# Patient Record
Sex: MALE | Race: WHITE | NOT HISPANIC OR LATINO | Employment: FULL TIME | URBAN - METROPOLITAN AREA
[De-identification: names, ages, dates, MRNs, and addresses within clinical notes are randomized per-mention and may not be internally consistent; named-entity substitution may affect disease eponyms.]

---
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
## 2021-04-17 ENCOUNTER — APPOINTMENT (OUTPATIENT)
Dept: RADIOLOGY | Facility: IMAGING CENTER | Age: 21
End: 2021-04-17
Attending: NURSE PRACTITIONER
Payer: COMMERCIAL

## 2021-04-17 ENCOUNTER — OFFICE VISIT (OUTPATIENT)
Dept: URGENT CARE | Facility: PHYSICIAN GROUP | Age: 21
End: 2021-04-17
Payer: COMMERCIAL

## 2021-04-17 VITALS
BODY MASS INDEX: 34.07 KG/M2 | DIASTOLIC BLOOD PRESSURE: 78 MMHG | OXYGEN SATURATION: 98 % | HEART RATE: 77 BPM | RESPIRATION RATE: 18 BRPM | SYSTOLIC BLOOD PRESSURE: 118 MMHG | HEIGHT: 69 IN | TEMPERATURE: 97.7 F | WEIGHT: 230 LBS

## 2021-04-17 DIAGNOSIS — S99.911A RIGHT ANKLE INJURY, INITIAL ENCOUNTER: ICD-10-CM

## 2021-04-17 DIAGNOSIS — S99.191A CLOSED FRACTURE OF BASE OF FIFTH METATARSAL BONE OF RIGHT FOOT AT METAPHYSEAL-DIAPHYSEAL JUNCTION, INITIAL ENCOUNTER: ICD-10-CM

## 2021-04-17 PROCEDURE — 73610 X-RAY EXAM OF ANKLE: CPT | Mod: TC,RT | Performed by: NURSE PRACTITIONER

## 2021-04-17 PROCEDURE — 99214 OFFICE O/P EST MOD 30 MIN: CPT | Mod: 25 | Performed by: NURSE PRACTITIONER

## 2021-04-17 PROCEDURE — 29515 APPLICATION SHORT LEG SPLINT: CPT | Performed by: NURSE PRACTITIONER

## 2021-04-17 ASSESSMENT — ENCOUNTER SYMPTOMS
BACK PAIN: 0
SENSORY CHANGE: 0
FOCAL WEAKNESS: 0
CHILLS: 0
DIZZINESS: 0
WEAKNESS: 0
NECK PAIN: 0
HEADACHES: 0
FEVER: 0

## 2021-04-17 NOTE — PROGRESS NOTES
"Subjective:   Hudson Vila is a 20 y.o. male who presents for Ankle Injury (right ankle, lateral side. swollen. landed on foot worng )      HPI  20-year-old male patient in urgent care for right ankle injury that occurred yesterday.  Patient states that he was participating in [employer] and stepped wrong causing his right ankle to roll to the outside.  Patient states he felt a pop and has been unable to place any weight on it since.  He has not taken anything over-the-counter for symptoms.  Patient states he has some slight tingling in the foot as well.  Has no problems with range of motion but it does produce pain when he moves it.    Review of Systems   Constitutional: Negative for chills, fever and malaise/fatigue.   Musculoskeletal: Positive for joint pain. Negative for back pain and neck pain.   Neurological: Negative for dizziness, sensory change, focal weakness, weakness and headaches.   All other systems reviewed and are negative.      Patient Active Problem List   Diagnosis   • No active medical problems     History reviewed. No pertinent surgical history.  Social History     Tobacco Use   • Smoking status: Never Smoker   • Smokeless tobacco: Never Used   Substance Use Topics   • Alcohol use: No   • Drug use: No      History reviewed. No pertinent family history.   (Allergies, Medications, & Tobacco/Substance Use were reconciled by the Medical Assistant and reviewed by myself. The family history is prepopulated)     Objective:     /78   Pulse 77   Temp 36.5 °C (97.7 °F) (Temporal)   Resp 18   Ht 1.753 m (5' 9\")   Wt 104 kg (230 lb)   SpO2 98%   BMI 33.97 kg/m²     Physical Exam  Vitals reviewed.   Constitutional:       Appearance: Normal appearance.   Cardiovascular:      Rate and Rhythm: Normal rate and regular rhythm.      Heart sounds: Normal heart sounds.   Pulmonary:      Effort: Pulmonary effort is normal.      Breath sounds: Normal breath sounds.   Musculoskeletal:      " Right ankle: Swelling present. No deformity, ecchymosis or lacerations. Tenderness present over the lateral malleolus. No medial malleolus, ATF ligament, AITF ligament, CF ligament, posterior TF ligament, base of 5th metatarsal or proximal fibula tenderness. Decreased range of motion. Normal pulse.      Comments: STS, TTP at right lateral malleolus    Skin:     Capillary Refill: Capillary refill takes less than 2 seconds.   Neurological:      Mental Status: He is alert and oriented to person, place, and time.   Psychiatric:         Mood and Affect: Mood normal.         Behavior: Behavior normal.         Thought Content: Thought content normal.         Judgment: Judgment normal.         Assessment/Plan:     1. Right ankle injury, initial encounter  DX-ANKLE 3+ VIEWS RIGHT   2. Closed fracture of base of fifth metatarsal bone of right foot at metaphyseal-diaphyseal junction, initial encounter  REFERRAL TO SPORTS MEDICINE        FINDINGS:  Bone mineralization is normal.  Acute transverse fracture through the proximal end of the diaphysis of the fifth metatarsal bone is identified. No other fractures are identified.  There is no soft tissue swelling seen. The ankle mortise is   well-maintained.        IMPRESSION:     Haas fracture through the proximal fifth metatarsal bone is identified.  All images read and interpreted by radiology - discussed with patient     Discussed physical examination findings as well as patient presentation, mechanism of injury and x-ray findings are consistent with a Haas fracture through the right proximal fifth metatarsal.  Posterior splint applied and referral to sports medicine provided as well as crutches.  Patient should remain nonweightbearing until he is cleared by specialist.  Discussed supportive care measures including rest, ice, elevation and over-the-counter NSAIDs and Tylenol per 's instructions.    Orthoglass splint applied by provider.  Patient is neurovascularly  intact after application of splint     Work note provided    Differential diagnosis, natural history, supportive care, and indications for immediate follow-up discussed.    Advised the patient to follow-up with the primary care physician for recheck, reevaluation, and consideration of further management.    Please note that this dictation was created using voice recognition software. I have made a reasonable attempt to correct obvious errors, but I expect that there are errors of grammar and possibly content that I did not discover before finalizing the note.    This note was electronically signed ÁNGELA Lam

## 2021-04-17 NOTE — LETTER
April 17, 2021         Patient: Hudson Vila   YOB: 2000   Date of Visit: 4/17/2021           To Whom it May Concern:    Hudson Vila was seen in my clinic on 4/17/2021. He is to be non weight bearing to right lower extremity until cleared by specialist     If you have any questions or concerns, please don't hesitate to call.        Sincerely,           Heather Hernandez A.P.R.N.  Electronically Signed

## 2021-04-21 ENCOUNTER — APPOINTMENT (OUTPATIENT)
Dept: RADIOLOGY | Facility: IMAGING CENTER | Age: 21
End: 2021-04-21
Attending: FAMILY MEDICINE
Payer: COMMERCIAL

## 2021-04-21 ENCOUNTER — OFFICE VISIT (OUTPATIENT)
Dept: MEDICAL GROUP | Facility: CLINIC | Age: 21
End: 2021-04-21
Payer: COMMERCIAL

## 2021-04-21 VITALS
WEIGHT: 230 LBS | HEIGHT: 69 IN | DIASTOLIC BLOOD PRESSURE: 72 MMHG | RESPIRATION RATE: 14 BRPM | TEMPERATURE: 98.3 F | BODY MASS INDEX: 34.07 KG/M2 | SYSTOLIC BLOOD PRESSURE: 120 MMHG | HEART RATE: 72 BPM | OXYGEN SATURATION: 95 %

## 2021-04-21 DIAGNOSIS — S92.351A DISPLACED FRACTURE OF FIFTH METATARSAL BONE, RIGHT FOOT, INITIAL ENCOUNTER FOR CLOSED FRACTURE: ICD-10-CM

## 2021-04-21 DIAGNOSIS — M84.374A STRESS FRACTURE OF METATARSAL BONE OF RIGHT FOOT, INITIAL ENCOUNTER: ICD-10-CM

## 2021-04-21 PROCEDURE — 99213 OFFICE O/P EST LOW 20 MIN: CPT | Performed by: FAMILY MEDICINE

## 2021-04-21 PROCEDURE — 73630 X-RAY EXAM OF FOOT: CPT | Mod: TC,RT | Performed by: FAMILY MEDICINE

## 2021-04-21 ASSESSMENT — ENCOUNTER SYMPTOMS
FEVER: 0
SORE THROAT: 0
VOMITING: 0
COUGH: 0
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
## 2021-05-01 ENCOUNTER — HOSPITAL ENCOUNTER (OUTPATIENT)
Dept: LAB | Facility: MEDICAL CENTER | Age: 21
End: 2021-05-01
Attending: ANESTHESIOLOGY
Payer: COMMERCIAL

## 2021-05-01 LAB
COVID ORDER STATUS COVID19: NORMAL
SARS-COV-2 RNA RESP QL NAA+PROBE: NOTDETECTED
SPECIMEN SOURCE: NORMAL

## 2021-05-01 PROCEDURE — U0003 INFECTIOUS AGENT DETECTION BY NUCLEIC ACID (DNA OR RNA); SEVERE ACUTE RESPIRATORY SYNDROME CORONAVIRUS 2 (SARS-COV-2) (CORONAVIRUS DISEASE [COVID-19]), AMPLIFIED PROBE TECHNIQUE, MAKING USE OF HIGH THROUGHPUT TECHNOLOGIES AS DESCRIBED BY CMS-2020-01-R: HCPCS

## 2021-05-01 PROCEDURE — C9803 HOPD COVID-19 SPEC COLLECT: HCPCS

## 2021-05-01 PROCEDURE — U0005 INFEC AGEN DETEC AMPLI PROBE: HCPCS

## 2021-10-26 ENCOUNTER — APPOINTMENT (OUTPATIENT)
Dept: RADIOLOGY | Facility: IMAGING CENTER | Age: 21
End: 2021-10-26
Attending: PHYSICIAN ASSISTANT
Payer: COMMERCIAL

## 2021-10-26 ENCOUNTER — OFFICE VISIT (OUTPATIENT)
Dept: URGENT CARE | Facility: PHYSICIAN GROUP | Age: 21
End: 2021-10-26
Payer: COMMERCIAL

## 2021-10-26 VITALS
RESPIRATION RATE: 16 BRPM | BODY MASS INDEX: 32.58 KG/M2 | TEMPERATURE: 97.8 F | OXYGEN SATURATION: 98 % | HEIGHT: 69 IN | HEART RATE: 60 BPM | DIASTOLIC BLOOD PRESSURE: 54 MMHG | SYSTOLIC BLOOD PRESSURE: 110 MMHG | WEIGHT: 220 LBS

## 2021-10-26 DIAGNOSIS — S00.33XA CONTUSION OF NOSE, INITIAL ENCOUNTER: ICD-10-CM

## 2021-10-26 DIAGNOSIS — S02.2XXA CLOSED FRACTURE OF NASAL BONE, INITIAL ENCOUNTER: ICD-10-CM

## 2021-10-26 PROCEDURE — 70160 X-RAY EXAM OF NASAL BONES: CPT | Mod: TC | Performed by: PHYSICIAN ASSISTANT

## 2021-10-26 PROCEDURE — 99214 OFFICE O/P EST MOD 30 MIN: CPT | Performed by: PHYSICIAN ASSISTANT

## 2021-10-26 ASSESSMENT — ENCOUNTER SYMPTOMS
EYE DISCHARGE: 0
SPEECH CHANGE: 0
PHOTOPHOBIA: 0
HEADACHES: 0
VOMITING: 0
SHORTNESS OF BREATH: 0
NAUSEA: 0
SENSORY CHANGE: 0
FOCAL WEAKNESS: 0
LOSS OF CONSCIOUSNESS: 1
WEAKNESS: 0
EYE REDNESS: 0
DIZZINESS: 0
DOUBLE VISION: 0
FALLS: 0
BLURRED VISION: 0
EYE PAIN: 0

## 2021-10-26 ASSESSMENT — VISUAL ACUITY: OU: 1
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
## 2022-10-04 SDOH — ECONOMIC STABILITY: HOUSING INSECURITY: IN THE LAST 12 MONTHS, HOW MANY PLACES HAVE YOU LIVED?: 1

## 2022-10-04 SDOH — ECONOMIC STABILITY: FOOD INSECURITY: WITHIN THE PAST 12 MONTHS, THE FOOD YOU BOUGHT JUST DIDN'T LAST AND YOU DIDN'T HAVE MONEY TO GET MORE.: NEVER TRUE

## 2022-10-04 SDOH — ECONOMIC STABILITY: FOOD INSECURITY: WITHIN THE PAST 12 MONTHS, YOU WORRIED THAT YOUR FOOD WOULD RUN OUT BEFORE YOU GOT MONEY TO BUY MORE.: NEVER TRUE

## 2022-10-04 SDOH — ECONOMIC STABILITY: TRANSPORTATION INSECURITY
IN THE PAST 12 MONTHS, HAS LACK OF RELIABLE TRANSPORTATION KEPT YOU FROM MEDICAL APPOINTMENTS, MEETINGS, WORK OR FROM GETTING THINGS NEEDED FOR DAILY LIVING?: NO

## 2022-10-04 SDOH — ECONOMIC STABILITY: TRANSPORTATION INSECURITY
IN THE PAST 12 MONTHS, HAS THE LACK OF TRANSPORTATION KEPT YOU FROM MEDICAL APPOINTMENTS OR FROM GETTING MEDICATIONS?: NO

## 2022-10-04 SDOH — HEALTH STABILITY: PHYSICAL HEALTH: ON AVERAGE, HOW MANY DAYS PER WEEK DO YOU ENGAGE IN MODERATE TO STRENUOUS EXERCISE (LIKE A BRISK WALK)?: 6 DAYS

## 2022-10-04 SDOH — ECONOMIC STABILITY: INCOME INSECURITY: HOW HARD IS IT FOR YOU TO PAY FOR THE VERY BASICS LIKE FOOD, HOUSING, MEDICAL CARE, AND HEATING?: NOT HARD AT ALL

## 2022-10-04 SDOH — ECONOMIC STABILITY: TRANSPORTATION INSECURITY
IN THE PAST 12 MONTHS, HAS LACK OF TRANSPORTATION KEPT YOU FROM MEETINGS, WORK, OR FROM GETTING THINGS NEEDED FOR DAILY LIVING?: NO

## 2022-10-04 SDOH — ECONOMIC STABILITY: HOUSING INSECURITY
IN THE LAST 12 MONTHS, WAS THERE A TIME WHEN YOU DID NOT HAVE A STEADY PLACE TO SLEEP OR SLEPT IN A SHELTER (INCLUDING NOW)?: NO

## 2022-10-04 SDOH — HEALTH STABILITY: PHYSICAL HEALTH: ON AVERAGE, HOW MANY MINUTES DO YOU ENGAGE IN EXERCISE AT THIS LEVEL?: 60 MIN

## 2022-10-04 SDOH — ECONOMIC STABILITY: INCOME INSECURITY: IN THE LAST 12 MONTHS, WAS THERE A TIME WHEN YOU WERE NOT ABLE TO PAY THE MORTGAGE OR RENT ON TIME?: NO

## 2022-10-04 SDOH — HEALTH STABILITY: MENTAL HEALTH
STRESS IS WHEN SOMEONE FEELS TENSE, NERVOUS, ANXIOUS, OR CAN'T SLEEP AT NIGHT BECAUSE THEIR MIND IS TROUBLED. HOW STRESSED ARE YOU?: NOT AT ALL

## 2022-10-04 ASSESSMENT — LIFESTYLE VARIABLES
HOW OFTEN DO YOU HAVE SIX OR MORE DRINKS ON ONE OCCASION: LESS THAN MONTHLY
AUDIT-C TOTAL SCORE: 4
SKIP TO QUESTIONS 9-10: 0
HOW OFTEN DO YOU HAVE A DRINK CONTAINING ALCOHOL: MONTHLY OR LESS
HOW MANY STANDARD DRINKS CONTAINING ALCOHOL DO YOU HAVE ON A TYPICAL DAY: 5 OR 6

## 2022-10-04 ASSESSMENT — SOCIAL DETERMINANTS OF HEALTH (SDOH)
ARE YOU MARRIED, WIDOWED, DIVORCED, SEPARATED, NEVER MARRIED, OR LIVING WITH A PARTNER?: NEVER MARRIED
HOW OFTEN DO YOU ATTENT MEETINGS OF THE CLUB OR ORGANIZATION YOU BELONG TO?: NEVER
WITHIN THE PAST 12 MONTHS, YOU WORRIED THAT YOUR FOOD WOULD RUN OUT BEFORE YOU GOT THE MONEY TO BUY MORE: NEVER TRUE
HOW OFTEN DO YOU ATTEND CHURCH OR RELIGIOUS SERVICES?: NEVER
HOW OFTEN DO YOU GET TOGETHER WITH FRIENDS OR RELATIVES?: ONCE A WEEK
IN A TYPICAL WEEK, HOW MANY TIMES DO YOU TALK ON THE PHONE WITH FAMILY, FRIENDS, OR NEIGHBORS?: MORE THAN THREE TIMES A WEEK
IN A TYPICAL WEEK, HOW MANY TIMES DO YOU TALK ON THE PHONE WITH FAMILY, FRIENDS, OR NEIGHBORS?: MORE THAN THREE TIMES A WEEK
DO YOU BELONG TO ANY CLUBS OR ORGANIZATIONS SUCH AS CHURCH GROUPS UNIONS, FRATERNAL OR ATHLETIC GROUPS, OR SCHOOL GROUPS?: NO
ARE YOU MARRIED, WIDOWED, DIVORCED, SEPARATED, NEVER MARRIED, OR LIVING WITH A PARTNER?: NEVER MARRIED
HOW OFTEN DO YOU ATTEND CHURCH OR RELIGIOUS SERVICES?: NEVER
HOW OFTEN DO YOU HAVE SIX OR MORE DRINKS ON ONE OCCASION: LESS THAN MONTHLY
HOW OFTEN DO YOU HAVE A DRINK CONTAINING ALCOHOL: MONTHLY OR LESS
HOW OFTEN DO YOU ATTENT MEETINGS OF THE CLUB OR ORGANIZATION YOU BELONG TO?: NEVER
DO YOU BELONG TO ANY CLUBS OR ORGANIZATIONS SUCH AS CHURCH GROUPS UNIONS, FRATERNAL OR ATHLETIC GROUPS, OR SCHOOL GROUPS?: NO
HOW HARD IS IT FOR YOU TO PAY FOR THE VERY BASICS LIKE FOOD, HOUSING, MEDICAL CARE, AND HEATING?: NOT HARD AT ALL
HOW MANY DRINKS CONTAINING ALCOHOL DO YOU HAVE ON A TYPICAL DAY WHEN YOU ARE DRINKING: 5 OR 6
HOW OFTEN DO YOU GET TOGETHER WITH FRIENDS OR RELATIVES?: ONCE A WEEK

## 2022-10-05 ENCOUNTER — OFFICE VISIT (OUTPATIENT)
Dept: MEDICAL GROUP | Facility: PHYSICIAN GROUP | Age: 22
End: 2022-10-05
Payer: COMMERCIAL

## 2022-10-05 VITALS
DIASTOLIC BLOOD PRESSURE: 80 MMHG | OXYGEN SATURATION: 97 % | HEART RATE: 64 BPM | BODY MASS INDEX: 32.82 KG/M2 | SYSTOLIC BLOOD PRESSURE: 100 MMHG | HEIGHT: 69 IN | TEMPERATURE: 97.4 F | WEIGHT: 221.6 LBS

## 2022-10-05 DIAGNOSIS — Z23 NEED FOR VACCINATION: ICD-10-CM

## 2022-10-05 DIAGNOSIS — Z02.5 SPORTS PHYSICAL: ICD-10-CM

## 2022-10-05 PROCEDURE — 90621 MENB-FHBP VACC 2/3 DOSE IM: CPT | Performed by: FAMILY MEDICINE

## 2022-10-05 PROCEDURE — 90686 IIV4 VACC NO PRSV 0.5 ML IM: CPT | Performed by: FAMILY MEDICINE

## 2022-10-05 PROCEDURE — 90472 IMMUNIZATION ADMIN EACH ADD: CPT | Performed by: FAMILY MEDICINE

## 2022-10-05 PROCEDURE — 90471 IMMUNIZATION ADMIN: CPT | Performed by: FAMILY MEDICINE

## 2022-10-05 PROCEDURE — 7101 PR PHYSICAL: Performed by: FAMILY MEDICINE

## 2022-10-05 ASSESSMENT — PATIENT HEALTH QUESTIONNAIRE - PHQ9: CLINICAL INTERPRETATION OF PHQ2 SCORE: 0

## 2022-10-05 NOTE — PROGRESS NOTES
"CHIEF COMPLAINT / REASON FOR VISIT  Hudson Vila is a 22 y.o. male that presents today to establish care for sports physical.    HISTORY OF PRESENT ILLNESS  Here to establish care.     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  They need his physician to fill out a physical form approving him for participation.    Currently weighing in at 200+ lb for his tournament.  Eventually plans on getting down to the sub-200 pound weight class but not for this 20-minute    Denies any symptoms with exertion or activity, including lightheadedness/dizziness, syncope, chest pain, palpitations.  Denies any family history of sudden cardiac death.    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    PHYSICAL EXAM  Constitutional: Sitting comfortably, in no acute distress, responds to questions appropriately.  Head: Normocephalic  Eyes:  No conjunctival injection, no scleral icterus, PERRL  Mouth: Oral mucosa moist. Good dentition  Throat: Oropharynx clear without erythema or tonsillar exudates  Neck: No cervical or supraclavicular lymphadenopathy. No JVD. No carotid bruits  Heart: Regular S1 S2, no murmurs, rub, or gallops  Lungs: Clear to auscultation bilaterally, no wheezes, rales, or rhonchi  Abdomen: Soft, nontender, nondistended  Extremities: No lower extremity edema. 2+ symmetric radial and pedal pulses  Neuro: Cranial nerves II-XII intact.  Normal " gait. Patellar, biceps, and Achilles tendon reflexes 2+ bilaterally. 5/5 strength in upper and lower extremities.  Skin: Warm and dry, no rashes or lesions on face or exposed upper extremities    ASSESSMENT & PLAN  1. Sports physical  Healthy 22-year-old male, approved for participation in [employer] tournament.  Form completed    2. Need for vaccination  - INFLUENZA VACCINE QUAD INJ (PF)  - Meningococcal Vaccine Serogroup B 2-3 Dose (TRUMENBA)      Follow-up as needed

## 2023-04-07 ENCOUNTER — OFFICE VISIT (OUTPATIENT)
Dept: URGENT CARE | Facility: CLINIC | Age: 23
End: 2023-04-07
Payer: COMMERCIAL

## 2023-04-07 VITALS
HEIGHT: 69 IN | OXYGEN SATURATION: 97 % | BODY MASS INDEX: 31.25 KG/M2 | RESPIRATION RATE: 16 BRPM | WEIGHT: 211 LBS | HEART RATE: 88 BPM | TEMPERATURE: 97.6 F

## 2023-04-07 DIAGNOSIS — S01.111A LACERATION OF RIGHT EYEBROW, INITIAL ENCOUNTER: ICD-10-CM

## 2023-04-07 PROCEDURE — 99213 OFFICE O/P EST LOW 20 MIN: CPT

## 2023-04-08 NOTE — PROGRESS NOTES
"Subjective:   Hudson Vila is a 22 y.o. male who presents for Facial Laceration (Right eye laceration )      HPI:    Presents to urgent care with concerns of laceration to his right upper eyebrow.  Patient states he was hit in the eyebrow by an elbow during karate practice.  Injury occurred approximately an hour before presenting to urgent care.  Denies loss of consciousness.    Denies headache, vision changes, weakness, balance/coordination changes, speech difficulties.      ROS As above in HPI    Medications:    No current outpatient medications on file prior to visit.     No current facility-administered medications on file prior to visit.        Allergies:   Patient has no known allergies.    Problem List:   Patient Active Problem List   Diagnosis    No active medical problems        Surgical History:  Past Surgical History:   Procedure Laterality Date    OPEN REDUCTION         Past Social Hx:   Social History     Tobacco Use    Smoking status: Never    Smokeless tobacco: Never   Vaping Use    Vaping Use: Never used   Substance Use Topics    Alcohol use: Yes     Comment: socially    Drug use: No          Problem list, medications, and allergies reviewed by myself today in Epic.     Objective:     Pulse 88   Temp 36.4 °C (97.6 °F) (Temporal)   Resp 16   Ht 1.753 m (5' 9\")   Wt 95.7 kg (211 lb)   SpO2 97%   BMI 31.16 kg/m²     Physical Exam  Vitals and nursing note reviewed.   Constitutional:       General: He is not in acute distress.     Appearance: Normal appearance. He is not ill-appearing or diaphoretic.   HENT:      Head: Normocephalic. Laceration present. No raccoon eyes, Ingram's sign, abrasion, contusion, masses, right periorbital erythema or left periorbital erythema.      Jaw: There is normal jaw occlusion.        Comments: Right mid eyebrow has 3 cm linear laceration.      Right Ear: Tympanic membrane and ear canal normal.      Left Ear: Tympanic membrane and ear canal normal.      " Nose: Nose normal.      Right Nostril: No epistaxis, septal hematoma or occlusion.      Left Nostril: No epistaxis, septal hematoma or occlusion.      Right Sinus: No maxillary sinus tenderness or frontal sinus tenderness.      Left Sinus: No maxillary sinus tenderness or frontal sinus tenderness.      Mouth/Throat:      Lips: No lesions.      Mouth: Mucous membranes are moist.      Pharynx: Oropharynx is clear. Uvula midline.   Eyes:      General:         Right eye: No discharge.         Left eye: No discharge.      Extraocular Movements: Extraocular movements intact.      Conjunctiva/sclera: Conjunctivae normal.      Pupils: Pupils are equal, round, and reactive to light.   Cardiovascular:      Rate and Rhythm: Normal rate and regular rhythm.      Heart sounds: Normal heart sounds. No murmur heard.  Pulmonary:      Effort: Pulmonary effort is normal.      Breath sounds: Normal breath sounds.   Skin:     General: Skin is warm and dry.      Capillary Refill: Capillary refill takes less than 2 seconds.      Findings: No rash.   Neurological:      Mental Status: He is alert and oriented to person, place, and time.       Assessment/Plan:     Diagnosis and associated orders:   1. Laceration of right eyebrow, initial encounter  - Laceration Repair        Comments/MDM:     Procedure: Laceration Repair  -Risks including bleeding, nerve damage, infection, and poor cosmetic outcome discussed. Benefits and alternatives discussed. Patient declined evaluation by ERP and Plastics.  - Wound was thoroughly cleaned with NS and Hibiclens  -No tendon/nerve/vascular involvement. No contamination  -Clean technique with sterile instruments and sutures used  -Local anesthesia with 2% lidocaine (approximately 1 cc total)  -Closed with # 6-0 Ethilon interrupted sutures with good approximation. Hemostasis achieved.  -Total 3 sutures  -Polysporin and dressing placed  -The patient tolerated the procedure well with no immediate complications.   There was nominal blood loss.      The patient is alerted to watch for any signs of infection (redness, pus, pain, increased swelling or fever) and return to  for evaluation. Home wound care instructions are provided.     Suture removal 7-10 days.    Follow up with PCP advised.     Please note that this dictation was created using voice recognition software. I have made a reasonable attempt to correct obvious errors, but I expect that there are errors of grammar and possibly content that I did not discover before finalizing the note.    This note was electronically signed by Maria C Portillo, DNP

## 2023-04-14 ENCOUNTER — OFFICE VISIT (OUTPATIENT)
Dept: URGENT CARE | Facility: PHYSICIAN GROUP | Age: 23
End: 2023-04-14
Payer: COMMERCIAL

## 2023-04-14 VITALS
SYSTOLIC BLOOD PRESSURE: 112 MMHG | BODY MASS INDEX: 30.42 KG/M2 | HEIGHT: 69 IN | WEIGHT: 205.4 LBS | TEMPERATURE: 97.8 F | OXYGEN SATURATION: 97 % | HEART RATE: 56 BPM | DIASTOLIC BLOOD PRESSURE: 64 MMHG

## 2023-04-14 DIAGNOSIS — Z48.02 ENCOUNTER FOR REMOVAL OF SUTURES: ICD-10-CM

## 2023-04-14 PROCEDURE — 99212 OFFICE O/P EST SF 10 MIN: CPT

## 2023-04-14 ASSESSMENT — ENCOUNTER SYMPTOMS
CHILLS: 0
SHORTNESS OF BREATH: 0

## 2023-04-14 NOTE — PROGRESS NOTES
"Subjective:     CHIEF COMPLAINT  Chief Complaint   Patient presents with    Suture / Staple Removal     Pt presents to get 3 sutures removed from right eyebrow. Sutures were placed x7d.        HPI  Hudson Vila is a very pleasant 22 y.o. male who presents for a suture removal. Patient had 3 simple interrupted sutures placed in his R eyebrow after getting hit in the head during karate practice. Patient denies any pain, discharge, or redness surrounding wound. He feels well and has no complaints or concerns for today's visit.     REVIEW OF SYSTEMS  Review of Systems   Constitutional:  Negative for chills and malaise/fatigue.   Respiratory:  Negative for shortness of breath.    Cardiovascular:  Negative for chest pain.     PAST MEDICAL HISTORY  Patient Active Problem List    Diagnosis Date Noted    No active medical problems 11/19/2014       SURGICAL HISTORY   has a past surgical history that includes open reduction.    ALLERGIES  No Known Allergies    CURRENT MEDICATIONS  Home Medications       Reviewed by Gely Stone P.A.-C. (Physician Assistant) on 04/14/23 at 1533  Med List Status: <None>     Medication Last Dose Status        Patient Kalia Taking any Medications                           SOCIAL HISTORY  Social History     Tobacco Use    Smoking status: Never    Smokeless tobacco: Never   Vaping Use    Vaping Use: Never used   Substance and Sexual Activity    Alcohol use: Yes     Comment: socially    Drug use: No    Sexual activity: Yes     Partners: Female     Birth control/protection: Condom       FAMILY HISTORY  History reviewed. No pertinent family history.       Objective:     VITAL SIGNS: /64 (BP Location: Right arm, Patient Position: Sitting, BP Cuff Size: Small adult)   Pulse (!) 56   Temp 36.6 °C (97.8 °F) (Temporal)   Ht 1.753 m (5' 9\")   Wt 93.2 kg (205 lb 6.4 oz)   SpO2 97%   BMI 30.33 kg/m²     PHYSICAL EXAM  Physical Exam  Constitutional:       General: He is not in " acute distress.     Appearance: Normal appearance. He is normal weight.   HENT:      Head: Normocephalic and atraumatic.      Mouth/Throat:      Mouth: Mucous membranes are moist.   Eyes:      Conjunctiva/sclera: Conjunctivae normal.   Pulmonary:      Effort: Pulmonary effort is normal.   Skin:     General: Skin is warm and dry.             Comments: Small, well-healed laceration across R eyebrow. Three simple interrupted sutures removed. Patient tolerated well. No signs of infection.    Neurological:      General: No focal deficit present.      Mental Status: He is alert and oriented to person, place, and time.   Psychiatric:         Mood and Affect: Mood normal.       Assessment/Plan:     1. Encounter for removal of sutures  -Patient tolerated procedure well    MDM/Comments:  Patient has stable vital signs and is non-toxic appearing. Three simple interrupted sutures removed. Patient tolerated well. No signs of infection. Wound is well healed and well approximated.     Differential diagnosis, natural history, supportive care, and indications for immediate follow-up discussed. All questions answered. Patient agrees with the plan of care.    Follow-up as needed if symptoms worsen or fail to improve to PCP, Urgent care or Emergency Room.    I have personally reviewed prior external notes and test results pertinent to today's visit.  I have independently reviewed and interpreted all diagnostics ordered during this urgent care acute visit.   Discussed management options (risks,benefits, and alternatives to treatment). Pt expresses understanding and the treatment plan was agreed upon. Questions were encouraged and answered to pt's satisfaction.    Please note that this dictation was created using voice recognition software. I have made a reasonable attempt to correct obvious errors, but I expect that there are errors of grammar and possibly content that I did not discover before finalizing the note.

## 2023-09-22 ENCOUNTER — OFFICE VISIT (OUTPATIENT)
Dept: MEDICAL GROUP | Facility: PHYSICIAN GROUP | Age: 23
End: 2023-09-22
Payer: COMMERCIAL

## 2023-09-22 VITALS
SYSTOLIC BLOOD PRESSURE: 118 MMHG | HEIGHT: 69 IN | WEIGHT: 205 LBS | TEMPERATURE: 98.5 F | DIASTOLIC BLOOD PRESSURE: 62 MMHG | OXYGEN SATURATION: 95 % | BODY MASS INDEX: 30.36 KG/M2 | RESPIRATION RATE: 14 BRPM | HEART RATE: 64 BPM

## 2023-09-22 DIAGNOSIS — Z02.5 SPORTS PHYSICAL: ICD-10-CM

## 2023-09-22 DIAGNOSIS — Z23 NEED FOR VACCINATION: ICD-10-CM

## 2023-09-22 PROCEDURE — 90686 IIV4 VACC NO PRSV 0.5 ML IM: CPT | Performed by: FAMILY MEDICINE

## 2023-09-22 PROCEDURE — 90471 IMMUNIZATION ADMIN: CPT | Performed by: FAMILY MEDICINE

## 2023-09-22 PROCEDURE — 7101 PR PHYSICAL: Performed by: FAMILY MEDICINE

## 2023-09-22 ASSESSMENT — PATIENT HEALTH QUESTIONNAIRE - PHQ9: CLINICAL INTERPRETATION OF PHQ2 SCORE: 0

## 2023-09-22 NOTE — PROGRESS NOTES
"CHIEF COMPLAINT / REASON FOR VISIT  Hudson Vila is a 23 y.o. male that presents today for form    HISTORY OF PRESENT ILLNESS  He is doing a Karate tournament in [place] and needs his physician to fill out a form approving him for participation.    Denies any symptoms with exertion or activity, including lightheadedness/dizziness, syncope, chest pain, palpitations.  Denies any family history of sudden cardiac death.    OBJECTIVE     /62 (BP Location: Right arm, Patient Position: Sitting, BP Cuff Size: Adult)   Pulse 64   Temp 36.9 °C (98.5 °F) (Temporal)   Resp 14   Ht 1.753 m (5' 9\")   Wt 93 kg (205 lb)   SpO2 95%   BMI 30.27 kg/m²  Body mass index is 30.27 kg/m².    PHYSICAL EXAM  Constitutional: Sitting comfortably, in no acute distress, responds to questions appropriately.  Eyes:  No conjunctival injection, no scleral icterus, PERRL  Ears: Normal tympanic membranes, external ear canals clear  Neck: No cervical lymphadenopathy  Mouth: oral mucosa moist  Throat: Oropharynx clear  Heart: Regular S1 S2, no murmurs, rub, or gallops  Lungs: Clear to auscultation bilaterally, no wheezes, rales, or rhonchi  Extremities: No lower extremity edema  Skin: Warm and dry, no rashes or lesions on face or exposed upper extremities    ASSESSMENT & PLAN  1. Sports physical  Cleared for participation in his upcoming karate tournament, no restrictions.  Form completed and given to patient    2. Need for vaccination  - INFLUENZA VACCINE QUAD INJ (PF)        "

## 2024-05-08 ENCOUNTER — DOCUMENTATION (OUTPATIENT)
Dept: HEALTH INFORMATION MANAGEMENT | Facility: OTHER | Age: 24
End: 2024-05-08
Payer: COMMERCIAL